# Patient Record
Sex: MALE | Race: WHITE | Employment: FULL TIME | URBAN - METROPOLITAN AREA
[De-identification: names, ages, dates, MRNs, and addresses within clinical notes are randomized per-mention and may not be internally consistent; named-entity substitution may affect disease eponyms.]

---

## 2017-10-17 ENCOUNTER — HOSPITAL ENCOUNTER (EMERGENCY)
Age: 37
Discharge: HOME | End: 2017-10-17
Attending: EMERGENCY MEDICINE
Payer: COMMERCIAL

## 2017-10-17 VITALS
WEIGHT: 315 LBS | OXYGEN SATURATION: 95 % | RESPIRATION RATE: 20 BRPM | DIASTOLIC BLOOD PRESSURE: 79 MMHG | BODY MASS INDEX: 42.66 KG/M2 | SYSTOLIC BLOOD PRESSURE: 140 MMHG | TEMPERATURE: 99.9 F | HEART RATE: 83 BPM | HEIGHT: 72 IN

## 2017-10-17 DIAGNOSIS — T78.40XA ALLERGIC REACTION, INITIAL ENCOUNTER: Primary | ICD-10-CM

## 2017-10-17 PROCEDURE — 99283 EMERGENCY DEPT VISIT LOW MDM: CPT

## 2017-10-17 PROCEDURE — 6370000000 HC RX 637 (ALT 250 FOR IP): Performed by: EMERGENCY MEDICINE

## 2017-10-17 PROCEDURE — 6360000002 HC RX W HCPCS: Performed by: EMERGENCY MEDICINE

## 2017-10-17 PROCEDURE — 96374 THER/PROPH/DIAG INJ IV PUSH: CPT

## 2017-10-17 RX ORDER — DEXAMETHASONE SODIUM PHOSPHATE 10 MG/ML
10 INJECTION INTRAMUSCULAR; INTRAVENOUS ONCE
Status: COMPLETED | OUTPATIENT
Start: 2017-10-17 | End: 2017-10-17

## 2017-10-17 RX ORDER — DIPHENHYDRAMINE HCL 25 MG
50 TABLET ORAL ONCE
Status: COMPLETED | OUTPATIENT
Start: 2017-10-17 | End: 2017-10-17

## 2017-10-17 RX ORDER — ALLOPURINOL 100 MG/1
200 TABLET ORAL DAILY
COMMUNITY

## 2017-10-17 RX ORDER — FAMOTIDINE 20 MG/1
20 TABLET, FILM COATED ORAL ONCE
Status: COMPLETED | OUTPATIENT
Start: 2017-10-17 | End: 2017-10-17

## 2017-10-17 RX ORDER — EPINEPHRINE 0.3 MG/.3ML
INJECTION SUBCUTANEOUS
Qty: 1 EACH | Refills: 0 | Status: SHIPPED | OUTPATIENT
Start: 2017-10-17

## 2017-10-17 RX ADMIN — DIPHENHYDRAMINE HCL 50 MG: 25 TABLET ORAL at 05:54

## 2017-10-17 RX ADMIN — FAMOTIDINE 20 MG: 20 TABLET, FILM COATED ORAL at 05:54

## 2017-10-17 RX ADMIN — DEXAMETHASONE SODIUM PHOSPHATE 10 MG: 10 INJECTION INTRAMUSCULAR; INTRAVENOUS at 05:54

## 2017-10-17 ASSESSMENT — ENCOUNTER SYMPTOMS
SHORTNESS OF BREATH: 0
ABDOMINAL DISTENTION: 0
CHEST TIGHTNESS: 0
EYE REDNESS: 0
ABDOMINAL PAIN: 0
FACIAL SWELLING: 0
EYE DISCHARGE: 0

## 2017-10-17 NOTE — ED NOTES
Pt to ER by self, ambulated to room, steady gait. Pt reports he suspects allergic reaction to unknown substance. Pt reports BL eye swelling, denies itches, denies CP, reports mild SOB, mouth/ throat not tingling or tight. Pt reports multiple environmental allergens, but denies coming into contact with any of them. Pt denies pain, no meds prior to arrival. Pt denies taking new meds or eating new foods.  Pt appears to be nervous, but cooperative, respers even non labored, skin warm pink, no distress, here for eval.      Attila Villa RN  10/17/17 0600

## 2017-10-17 NOTE — ED PROVIDER NOTES
[address] [facility] ED  eMERGENCY dEPARTMENT eNCOUnter      Pt Name: Javan Rothman  MRN: [id number]  [place] 1980  Date of evaluation: 10/17/2017  Provider: Maury Mario       CHIEF COMPLAINT       Chief Complaint   Patient presents with    Allergic Reaction         HISTORY OF PRESENT ILLNESS  (Location/Symptom, Timing/Onset, Context/Setting, Quality, Duration, Modifying Factors, Severity.)   Javan Rothman is a 40 y.o. male who presents to the emergency department the patient came home from work and was at home getting ready to eat something and he noticed he started having some swelling of his eyes and face. The patient states this happened before a few years ago and he had an unknown trigger at that time also. The patient says his eyes are itchy but denies any tightness of the throat or sore throat. The patient denies any wheezing or shortness of breath other than his baseline asthma. The patient denies any rashes other than his eye swelling. Nursing Notes were reviewed. REVIEW OF SYSTEMS    (2-9 systems for level 4, 10 or more for level 5)     Review of Systems   Constitutional: Negative for chills and fatigue. HENT: Negative for facial swelling. Eyes: Negative for discharge and redness. Respiratory: Negative for chest tightness and shortness of breath. Cardiovascular: Negative for chest pain and palpitations. Gastrointestinal: Negative for abdominal distention and abdominal pain. Genitourinary: Negative for dysuria and hematuria. Musculoskeletal: Negative for gait problem, joint swelling and neck pain. Skin: Positive for rash. Negative for pallor. Neurological: Negative for speech difficulty and headaches. All other systems reviewed and are negative. Except as noted above the remainder of the review of systems was reviewed and negative.        PAST MEDICAL HISTORY     Past Medical History:   Diagnosis Date    Asthma     Gout          SURGICAL HISTORY preliminarily interpreted by the emergency physician with the below findings:    No results found. Interpretation per the Radiologist below, if available at the time of this note:    No orders to display         ED BEDSIDE ULTRASOUND:   Performed by ED Physician - none    LABS:  Labs Reviewed - No data to display    All other labs were within normal range or not returned as of this dictation. EMERGENCY DEPARTMENT COURSE and DIFFERENTIAL DIAGNOSIS/MDM:   Vitals:    Vitals:    10/17/17 0543   BP: (!) 173/94   Pulse: 121   Resp: 20   Temp: 99.9 °F (37.7 °C)   TempSrc: Oral   SpO2: 96%   Weight: (!) 149.7 kg (330 lb)   Height: 6' (1.829 m)       Will try benadryl, pepcid, and decadron. No throat or respiratory symptoms. We will have the patient wash his face. RE-EVALUATION:    The swelling around the patient's eyelids because of lot improved. The patient states he is feeling better. He would like a prescription for an EpiPen because he lives alone and travels a lot for work and he doesn't know what is causing this. The patient still does not have any involvement of his throat. I spoke with the patient about the test results and answered any questions. The patient is told to return to the ER if any changes or worsening of their symptoms. The patient voices understanding. CONSULTS:  None    PROCEDURES:  None    FINAL IMPRESSION      1. Allergic reaction, initial encounter          DISPOSITION/PLAN   DISPOSITION Decision to Discharge    CONDITION ON DISPOSITION:  stable    PATIENT REFERRED TO:  Your PCP or clinic    Schedule an appointment as soon as possible for a visit in 2 days        DISCHARGE MEDICATIONS:  New Prescriptions    EPINEPHRINE (EPIPEN 2-JASMYNE) 0.3 MG/0.3ML SOAJ INJECTION    Please take as directed if allergic reaction occurs. Then come immediately to the Emergency Room for evaluation.   Repeat with second EpiPen if needed       (Please note that portions of this note were completed with a voice recognition program.  Efforts were made to edit the dictations but occasionally words are mis-transcribed.)    Jonah Parisi D.O., F.A.C.EMiraP.   Attending Emergency Physician         Jonah Parisi DO  10/17/17 9027